# Patient Record
Sex: MALE | Race: WHITE | NOT HISPANIC OR LATINO | Employment: FULL TIME | URBAN - METROPOLITAN AREA
[De-identification: names, ages, dates, MRNs, and addresses within clinical notes are randomized per-mention and may not be internally consistent; named-entity substitution may affect disease eponyms.]

---

## 2023-10-22 PROBLEM — K42.9 UMBILICAL HERNIA WITHOUT OBSTRUCTION AND WITHOUT GANGRENE: Status: ACTIVE | Noted: 2023-10-22

## 2023-10-22 PROBLEM — R06.02 SOB (SHORTNESS OF BREATH) ON EXERTION: Status: ACTIVE | Noted: 2023-10-22

## 2023-10-22 PROBLEM — J45.990 ASTHMA, EXERCISE INDUCED (HHS-HCC): Status: ACTIVE | Noted: 2023-10-22

## 2023-10-22 PROBLEM — I10 BENIGN ESSENTIAL HYPERTENSION: Status: ACTIVE | Noted: 2023-10-22

## 2023-10-22 RX ORDER — LISINOPRIL 10 MG/1
1 TABLET ORAL DAILY
COMMUNITY
Start: 2022-06-20 | End: 2023-11-02 | Stop reason: ALTCHOICE

## 2023-10-24 ENCOUNTER — OFFICE VISIT (OUTPATIENT)
Dept: SURGERY | Facility: CLINIC | Age: 43
End: 2023-10-24
Payer: COMMERCIAL

## 2023-10-24 VITALS
WEIGHT: 204.4 LBS | DIASTOLIC BLOOD PRESSURE: 94 MMHG | HEART RATE: 88 BPM | RESPIRATION RATE: 16 BRPM | SYSTOLIC BLOOD PRESSURE: 148 MMHG | OXYGEN SATURATION: 98 % | BODY MASS INDEX: 30.27 KG/M2 | TEMPERATURE: 98.6 F | HEIGHT: 69 IN

## 2023-10-24 DIAGNOSIS — K42.9 UMBILICAL HERNIA WITHOUT OBSTRUCTION AND WITHOUT GANGRENE: Primary | ICD-10-CM

## 2023-10-24 DIAGNOSIS — I10 BENIGN ESSENTIAL HYPERTENSION: ICD-10-CM

## 2023-10-24 PROCEDURE — 3077F SYST BP >= 140 MM HG: CPT | Performed by: SURGERY

## 2023-10-24 PROCEDURE — 99213 OFFICE O/P EST LOW 20 MIN: CPT | Performed by: SURGERY

## 2023-10-24 PROCEDURE — 1036F TOBACCO NON-USER: CPT | Performed by: SURGERY

## 2023-10-24 PROCEDURE — 3080F DIAST BP >= 90 MM HG: CPT | Performed by: SURGERY

## 2023-10-24 RX ORDER — SODIUM CHLORIDE, SODIUM LACTATE, POTASSIUM CHLORIDE, CALCIUM CHLORIDE 600; 310; 30; 20 MG/100ML; MG/100ML; MG/100ML; MG/100ML
100 INJECTION, SOLUTION INTRAVENOUS CONTINUOUS
Status: CANCELLED | OUTPATIENT
Start: 2023-10-24

## 2023-10-24 NOTE — H&P (VIEW-ONLY)
History Of Present Illness  Uriel Julien is a 43 y.o. male presenting with follow-up symptomatic umbilical hernia present since mid August.  Previously evaluated.  No change in health since the previous visit.         Past Medical History  No past medical history on file.    Surgical History  Past Surgical History:   Procedure Laterality Date    OTHER SURGICAL HISTORY  01/03/2022    [place] tooth extraction        Social History  He reports that he has never smoked. He has never used smokeless tobacco. He reports current alcohol use. He reports that he does not use drugs.    Family History  Family History   Problem Relation Name Age of Onset    Hypertension Mother      Macular degeneration Mother      Breast cancer Maternal Grandmother      Hyperlipidemia Maternal Grandfather      Hypertension Maternal Grandfather          Allergies  Patient has no known allergies.    Review of Systems currently denies chest pain shortness of breath leg pain.  See HPI     Physical Exam GENERAL  MENTAL STATUS - Alert. GENERAL APPEARANCE - Cooperative and well groomed. ORIENTATION - Oriented X4. BUILD & NUTRITION - Well nourished and well developed. HYDRATION - Well hydrated.    INTEGUMENTARY  GENERAL CHARACTERISTICS - Overall examination of the patient´s skin reveals no rashes. COLOR - not icteric. SKIN MOISTURE - normal skin moisture. TEMPERATURE - normal worth is noted.    HEAD AND NECK  HEAD  HEAD SHAPE - Atraumatic and normocephalic.  TRACHEA - midline.  THYROID  GLAND CHARACTERISTICS - normal size and consistency and no palpable nodules.    EYE  SCLERA/CONJUNCTIVA - BILATERAL - Anicteric.    CHEST AND LUNG EXAM  AUSCULTATION -  BREATH SOUNDS - Clear.    BREAST - Did not examine.    CARDIOVASCULAR  AUSCULTATION: RHYTHM - Regular. HEART SOUNDS - Normal heart sounds.  MURMURS & OTHER HEART SOUNDS - Auscultation of the heart reveals regular rate and no murmurs.    ABDOMEN  PALPATION/PERCUSSION - Palpation and percussion of the  "abdomen reveal soft, non tender, no mass and no hepatosplenomegaly.  2 cm defect at umbilicus reducible.  No inguinal hernia no femoral hernia    LYMPHATIC  GENERAL LYMPHATICS  BREAST LYMPHATIC EXAM - No cervical adenopathy, supraclavicular adenopathy or axilliary adenopathy.        Last Recorded Vitals  Blood pressure (!) 148/94, pulse 88, temperature 37 °C (98.6 °F), temperature source Temporal, resp. rate 16, height 1.753 m (5' 9\"), weight 92.7 kg (204 lb 6.4 oz), SpO2 98 %.    Relevant Results             Assessment/Plan       Symptomatic umbilical hernia.  I have advocated immediate medical attention for painful and or nonreducible mass and recommended elective repair of the hernia. I have discussed the risks ,benefits and the alternatives to repair as well as the risks and benefits of the alternatives including but not limited to bleeding and infection. I have placed the risk of recurrence with mesh at 1% and up to 2-5% without mesh. I discussed the risk of infection and rejection of the mesh and need for repeat intervention as well as the risk of neuropathy, partial or permanent. I discussed potential for bowel resection in the face of strangulation and/or incarceration, limited use of mesh under these circumstances and increased risk of recurrence. Questions were answered and the patient agrees to proceed.       I spent 20 minutes in the professional and overall care of this patient.      Noel Ortiz MD    "

## 2023-10-24 NOTE — H&P
History Of Present Illness  Uriel Julien is a 43 y.o. male presenting with follow-up symptomatic umbilical hernia present since mid August.  Previously evaluated.  No change in health since the previous visit.         Past Medical History  No past medical history on file.    Surgical History  Past Surgical History:   Procedure Laterality Date    OTHER SURGICAL HISTORY  01/03/2022    [place] tooth extraction        Social History  He reports that he has never smoked. He has never used smokeless tobacco. He reports current alcohol use. He reports that he does not use drugs.    Family History  Family History   Problem Relation Name Age of Onset    Hypertension Mother      Macular degeneration Mother      Breast cancer Maternal Grandmother      Hyperlipidemia Maternal Grandfather      Hypertension Maternal Grandfather          Allergies  Patient has no known allergies.    Review of Systems currently denies chest pain shortness of breath leg pain.  See HPI     Physical Exam GENERAL  MENTAL STATUS - Alert. GENERAL APPEARANCE - Cooperative and well groomed. ORIENTATION - Oriented X4. BUILD & NUTRITION - Well nourished and well developed. HYDRATION - Well hydrated.    INTEGUMENTARY  GENERAL CHARACTERISTICS - Overall examination of the patient´s skin reveals no rashes. COLOR - not icteric. SKIN MOISTURE - normal skin moisture. TEMPERATURE - normal worth is noted.    HEAD AND NECK  HEAD  HEAD SHAPE - Atraumatic and normocephalic.  TRACHEA - midline.  THYROID  GLAND CHARACTERISTICS - normal size and consistency and no palpable nodules.    EYE  SCLERA/CONJUNCTIVA - BILATERAL - Anicteric.    CHEST AND LUNG EXAM  AUSCULTATION -  BREATH SOUNDS - Clear.    BREAST - Did not examine.    CARDIOVASCULAR  AUSCULTATION: RHYTHM - Regular. HEART SOUNDS - Normal heart sounds.  MURMURS & OTHER HEART SOUNDS - Auscultation of the heart reveals regular rate and no murmurs.    ABDOMEN  PALPATION/PERCUSSION - Palpation and percussion of the  "abdomen reveal soft, non tender, no mass and no hepatosplenomegaly.  2 cm defect at umbilicus reducible.  No inguinal hernia no femoral hernia    LYMPHATIC  GENERAL LYMPHATICS  BREAST LYMPHATIC EXAM - No cervical adenopathy, supraclavicular adenopathy or axilliary adenopathy.        Last Recorded Vitals  Blood pressure (!) 148/94, pulse 88, temperature 37 °C (98.6 °F), temperature source Temporal, resp. rate 16, height 1.753 m (5' 9\"), weight 92.7 kg (204 lb 6.4 oz), SpO2 98 %.    Relevant Results             Assessment/Plan       Symptomatic umbilical hernia.  I have advocated immediate medical attention for painful and or nonreducible mass and recommended elective repair of the hernia. I have discussed the risks ,benefits and the alternatives to repair as well as the risks and benefits of the alternatives including but not limited to bleeding and infection. I have placed the risk of recurrence with mesh at 1% and up to 2-5% without mesh. I discussed the risk of infection and rejection of the mesh and need for repeat intervention as well as the risk of neuropathy, partial or permanent. I discussed potential for bowel resection in the face of strangulation and/or incarceration, limited use of mesh under these circumstances and increased risk of recurrence. Questions were answered and the patient agrees to proceed.       I spent 20 minutes in the professional and overall care of this patient.      Noel Ortiz MD    "

## 2023-10-24 NOTE — H&P
History Of Present Illness  Uriel Julien is a 43 y.o. male presenting with painful mass around the umbilicus.  No past history of coronary artery disease hypertension diabetes.  Patient identified the area in mid August.  No history of weight loss change in bowel habits no history of bleeding infectious complications related to surgery.  Non-smoker denies drug and alcohol abuse     Past Medical History  No past medical history on file.    Surgical History  Past Surgical History:   Procedure Laterality Date    OTHER SURGICAL HISTORY  01/03/2022    [place] tooth extraction        Social History  He reports that he has never smoked. He has never used smokeless tobacco. He reports current alcohol use. He reports that he does not use drugs.    Family History  Family History   Problem Relation Name Age of Onset    Hypertension Mother      Macular degeneration Mother      Breast cancer Maternal Grandmother      Hyperlipidemia Maternal Grandfather      Hypertension Maternal Grandfather          Allergies  Patient has no known allergies.    Review of Systems 10 review of systems negative     Physical Exam GENERAL  MENTAL STATUS - Alert. GENERAL APPEARANCE - Cooperative and well groomed. ORIENTATION - Oriented X4. BUILD & NUTRITION - Well nourished and well developed. HYDRATION - Well hydrated.    INTEGUMENTARY  GENERAL CHARACTERISTICS - Overall examination of the patient´s skin reveals no rashes. COLOR - not icteric. SKIN MOISTURE - normal skin moisture. TEMPERATURE - normal worth is noted.    HEAD AND NECK  HEAD  HEAD SHAPE - Atraumatic and normocephalic.  TRACHEA - midline.  THYROID  GLAND CHARACTERISTICS - normal size and consistency and no palpable nodules.    EYE  SCLERA/CONJUNCTIVA - BILATERAL - Anicteric.    CHEST AND LUNG EXAM  AUSCULTATION -  BREATH SOUNDS - Clear.    BREAST - Did not examine.    CARDIOVASCULAR  AUSCULTATION: RHYTHM - Regular. HEART SOUNDS - Normal heart sounds.  MURMURS & OTHER HEART SOUNDS -  "Auscultation of the heart reveals regular rate and no murmurs.    ABDOMEN  PALPATION/PERCUSSION - Palpation and percussion of the abdomen reveal soft, non tender, no mass and no hepatosplenomegaly.  2 cm defect at umbilicus reducible.  No evidence of umbilical hernia    LYMPHATIC  GENERAL LYMPHATICS  BREAST LYMPHATIC EXAM - No cervical adenopathy, supraclavicular adenopathy or axilliary adenopathy.        Last Recorded Vitals  Blood pressure (!) 148/94, pulse 88, temperature 37 °C (98.6 °F), temperature source Temporal, resp. rate 16, height 1.753 m (5' 9\"), weight 92.7 kg (204 lb 6.4 oz), SpO2 98 %.    Relevant Results           Assessment/Plan       Symptomatic umbilical hernia.  I have advocated immediate medical attention for painful and or nonreducible mass and recommended elective repair of the hernia. I have discussed the risks ,benefits and the alternatives to repair as well as the risks and benefits of the alternatives including but not limited to bleeding and infection. I have placed the risk of recurrence with mesh at 1% and up to 2-5% without mesh. I discussed the risk of infection and rejection of the mesh and need for repeat intervention as well as the risk of neuropathy, partial or permanent. I discussed potential for bowel resection in the face of strangulation and/or incarceration, limited use of mesh under these circumstances and increased risk of recurrence. Questions were answered and the patient agrees to proceed.       I spent 20 minutes in the professional and overall care of this patient.      Noel Ortiz MD    "

## 2023-10-24 NOTE — H&P
History Of Present Illness  Uriel Julien is a 43 y.o. male presenting with painful mass around the umbilicus.  No past history of coronary artery disease hypertension diabetes.  Patient identified the area in mid August.  No history of weight loss change in bowel habits no history of bleeding infectious complications related to surgery.  Non-smoker denies drug and alcohol abuse     Past Medical History  No past medical history on file.    Surgical History  Past Surgical History:   Procedure Laterality Date    OTHER SURGICAL HISTORY  01/03/2022    [place] tooth extraction        Social History  He reports that he has never smoked. He has never used smokeless tobacco. He reports current alcohol use. He reports that he does not use drugs.    Family History  Family History   Problem Relation Name Age of Onset    Hypertension Mother      Macular degeneration Mother      Breast cancer Maternal Grandmother      Hyperlipidemia Maternal Grandfather      Hypertension Maternal Grandfather          Allergies  Patient has no known allergies.    Review of Systems 10 review of systems negative     Physical Exam GENERAL  MENTAL STATUS - Alert. GENERAL APPEARANCE - Cooperative and well groomed. ORIENTATION - Oriented X4. BUILD & NUTRITION - Well nourished and well developed. HYDRATION - Well hydrated.    INTEGUMENTARY  GENERAL CHARACTERISTICS - Overall examination of the patient´s skin reveals no rashes. COLOR - not icteric. SKIN MOISTURE - normal skin moisture. TEMPERATURE - normal worth is noted.    HEAD AND NECK  HEAD  HEAD SHAPE - Atraumatic and normocephalic.  TRACHEA - midline.  THYROID  GLAND CHARACTERISTICS - normal size and consistency and no palpable nodules.    EYE  SCLERA/CONJUNCTIVA - BILATERAL - Anicteric.    CHEST AND LUNG EXAM  AUSCULTATION -  BREATH SOUNDS - Clear.    BREAST - Did not examine.    CARDIOVASCULAR  AUSCULTATION: RHYTHM - Regular. HEART SOUNDS - Normal heart sounds.  MURMURS & OTHER HEART SOUNDS -  "Auscultation of the heart reveals regular rate and no murmurs.    ABDOMEN  PALPATION/PERCUSSION - Palpation and percussion of the abdomen reveal soft, non tender, no mass and no hepatosplenomegaly.  2 cm defect at umbilicus reducible.  No evidence of umbilical hernia    LYMPHATIC  GENERAL LYMPHATICS  BREAST LYMPHATIC EXAM - No cervical adenopathy, supraclavicular adenopathy or axilliary adenopathy.        Last Recorded Vitals  Blood pressure (!) 148/94, pulse 88, temperature 37 °C (98.6 °F), temperature source Temporal, resp. rate 16, height 1.753 m (5' 9\"), weight 92.7 kg (204 lb 6.4 oz), SpO2 98 %.    Relevant Results           Assessment/Plan       Symptomatic umbilical hernia.  I have advocated immediate medical attention for painful and or nonreducible mass and recommended elective repair of the hernia. I have discussed the risks ,benefits and the alternatives to repair as well as the risks and benefits of the alternatives including but not limited to bleeding and infection. I have placed the risk of recurrence with mesh at 1% and up to 2-5% without mesh. I discussed the risk of infection and rejection of the mesh and need for repeat intervention as well as the risk of neuropathy, partial or permanent. I discussed potential for bowel resection in the face of strangulation and/or incarceration, limited use of mesh under these circumstances and increased risk of recurrence. Questions were answered and the patient agrees to proceed.       I spent 20 minutes in the professional and overall care of this patient.      Noel Ortiz MD    "

## 2023-10-24 NOTE — H&P
History Of Present Illness  Uriel Julien is a 43 y.o. male presenting with painful mass around the umbilicus.  No past history of coronary artery disease hypertension diabetes.  Patient identified the area in mid August.  No history of weight loss change in bowel habits no history of bleeding infectious complications related to surgery.  Non-smoker denies drug and alcohol abuse     Past Medical History  No past medical history on file.    Surgical History  Past Surgical History:   Procedure Laterality Date    OTHER SURGICAL HISTORY  01/03/2022    [place] tooth extraction        Social History  He reports that he has never smoked. He has never used smokeless tobacco. He reports current alcohol use. He reports that he does not use drugs.    Family History  Family History   Problem Relation Name Age of Onset    Hypertension Mother      Macular degeneration Mother      Breast cancer Maternal Grandmother      Hyperlipidemia Maternal Grandfather      Hypertension Maternal Grandfather          Allergies  Patient has no known allergies.    Review of Systems 10 review of systems negative     Physical Exam GENERAL  MENTAL STATUS - Alert. GENERAL APPEARANCE - Cooperative and well groomed. ORIENTATION - Oriented X4. BUILD & NUTRITION - Well nourished and well developed. HYDRATION - Well hydrated.    INTEGUMENTARY  GENERAL CHARACTERISTICS - Overall examination of the patient´s skin reveals no rashes. COLOR - not icteric. SKIN MOISTURE - normal skin moisture. TEMPERATURE - normal worth is noted.    HEAD AND NECK  HEAD  HEAD SHAPE - Atraumatic and normocephalic.  TRACHEA - midline.  THYROID  GLAND CHARACTERISTICS - normal size and consistency and no palpable nodules.    EYE  SCLERA/CONJUNCTIVA - BILATERAL - Anicteric.    CHEST AND LUNG EXAM  AUSCULTATION -  BREATH SOUNDS - Clear.    BREAST - Did not examine.    CARDIOVASCULAR  AUSCULTATION: RHYTHM - Regular. HEART SOUNDS - Normal heart sounds.  MURMURS & OTHER HEART SOUNDS -  "Auscultation of the heart reveals regular rate and no murmurs.    ABDOMEN  PALPATION/PERCUSSION - Palpation and percussion of the abdomen reveal soft, non tender, no mass and no hepatosplenomegaly.  2 cm defect at umbilicus reducible.  No evidence of umbilical hernia    LYMPHATIC  GENERAL LYMPHATICS  BREAST LYMPHATIC EXAM - No cervical adenopathy, supraclavicular adenopathy or axilliary adenopathy.        Last Recorded Vitals  Blood pressure (!) 148/94, pulse 88, temperature 37 °C (98.6 °F), temperature source Temporal, resp. rate 16, height 1.753 m (5' 9\"), weight 92.7 kg (204 lb 6.4 oz), SpO2 98 %.    Relevant Results           Assessment/Plan       Symptomatic umbilical hernia.  I have advocated immediate medical attention for painful and or nonreducible mass and recommended elective repair of the hernia. I have discussed the risks ,benefits and the alternatives to repair as well as the risks and benefits of the alternatives including but not limited to bleeding and infection. I have placed the risk of recurrence with mesh at 1% and up to 2-5% without mesh. I discussed the risk of infection and rejection of the mesh and need for repeat intervention as well as the risk of neuropathy, partial or permanent. I discussed potential for bowel resection in the face of strangulation and/or incarceration, limited use of mesh under these circumstances and increased risk of recurrence. Questions were answered and the patient agrees to proceed.       I spent 20 minutes in the professional and overall care of this patient.      Noel Ortiz MD    "

## 2023-10-27 NOTE — PATIENT INSTRUCTIONS
Repair of your umbilical hernia will be performed as discussed.  Please seek immediate medical attention for any painful persistent mass over the area prior to your proposed procedure.  Contact the office for any questions regarding today's exam or your proposed procedure.  COVID-19 is currently not required for testing for outpatient surgery.  However should you experience any symptoms suggestive of COVID-19 including but not limited to fever chills sore throat runny nose cough persistent abdominal pains loss of the sense that smell or taste please obtain testing and notify your primary care physician.

## 2023-10-27 NOTE — PROGRESS NOTES
History Of Present Illness  HPI   Follow-up umbilical hernia.  Still occasional pulling pain over the area.  Denies groin pain.  Past Medical History  He has no past medical history on file.    Surgical History  He has a past surgical history that includes Other surgical history (01/03/2022).     Social History  He reports that he has never smoked. He has never used smokeless tobacco. He reports current alcohol use. He reports that he does not use drugs.    Family History  Family History   Problem Relation Name Age of Onset    Hypertension Mother      Macular degeneration Mother      Breast cancer Maternal Grandmother      Hyperlipidemia Maternal Grandfather      Hypertension Maternal Grandfather          Allergies  Patient has no known allergies.    Review of Systems     Visit Vitals  BP (!) 148/94 (BP Location: Left arm, Patient Position: Sitting, BP Cuff Size: Large adult)   Pulse 88   Temp 37 °C (98.6 °F) (Temporal)   Resp 16        Physical Exam GENERAL  MENTAL STATUS - Alert. GENERAL APPEARANCE - Cooperative and well groomed. ORIENTATION - Oriented X4. BUILD & NUTRITION - Well nourished and well developed. HYDRATION - Well hydrated.    INTEGUMENTARY  GENERAL CHARACTERISTICS - Overall examination of the patient´s skin reveals no rashes. COLOR - not icteric. SKIN MOISTURE - normal skin moisture. TEMPERATURE - normal worth is noted.    HEAD AND NECK  HEAD  HEAD SHAPE - Atraumatic and normocephalic.  TRACHEA - midline.  THYROID  GLAND CHARACTERISTICS - normal size and consistency and no palpable nodules.    EYE  SCLERA/CONJUNCTIVA - BILATERAL - Anicteric.    CHEST AND LUNG EXAM  AUSCULTATION -  BREATH SOUNDS - Clear.    BREAST - Did not examine.    CARDIOVASCULAR  AUSCULTATION: RHYTHM - Regular. HEART SOUNDS - Normal heart sounds.  MURMURS & OTHER HEART SOUNDS - Auscultation of the heart reveals regular rate and no murmurs.    ABDOMEN  PALPATION/PERCUSSION - Palpation and percussion of the abdomen reveal soft, non  "tender, no mass and no hepatosplenomegaly.  Less than 3 cm reducible umbilical hernia.  No evidence of inguinal or femoral hernia    LYMPHATIC  GENERAL LYMPHATICS   LYMPHATIC EXAM - No cervical adenopathy, supraclavicular adenopathy or axilliary adenopathy.     Extremities without edema    Last Recorded Vitals  Blood pressure (!) 148/94, pulse 88, temperature 37 °C (98.6 °F), temperature source Temporal, resp. rate 16, height 1.753 m (5' 9\"), weight 92.7 kg (204 lb 6.4 oz), SpO2 98 %.    Relevant Results      No results found.      @[email or web address]@    Assessment/Plan       Symptomatic umbilical hernia.  I have advocated immediate medical attention for painful and or nonreducible mass and recommended elective repair of the hernia. I have discussed the risks ,benefits and the alternatives to repair as well as the risks and benefits of the alternatives including but not limited to bleeding and infection. I have placed the risk of recurrence with mesh at 1% and up to 2-5% without mesh. I discussed the risk of infection and rejection of the mesh and need for repeat intervention as well as the risk of neuropathy, partial or permanent. I discussed potential for bowel resection in the face of strangulation and/or incarceration, limited use of mesh under these circumstances and increased risk of recurrence. Questions were answered and the patient agrees to proceed.       I spent 20 minutes in the professional and overall care of this patient.      Noel Ortiz MD  "

## 2023-11-01 ENCOUNTER — LAB (OUTPATIENT)
Dept: LAB | Facility: LAB | Age: 43
End: 2023-11-01
Payer: COMMERCIAL

## 2023-11-01 DIAGNOSIS — K42.9 UMBILICAL HERNIA WITHOUT OBSTRUCTION AND WITHOUT GANGRENE: ICD-10-CM

## 2023-11-01 LAB
ANION GAP SERPL CALC-SCNC: 14 MMOL/L (ref 10–20)
BUN SERPL-MCNC: 16 MG/DL (ref 6–23)
CALCIUM SERPL-MCNC: 9.4 MG/DL (ref 8.6–10.3)
CHLORIDE SERPL-SCNC: 102 MMOL/L (ref 98–107)
CO2 SERPL-SCNC: 25 MMOL/L (ref 21–32)
CREAT SERPL-MCNC: 1.08 MG/DL (ref 0.5–1.3)
ERYTHROCYTE [DISTWIDTH] IN BLOOD BY AUTOMATED COUNT: 12.9 % (ref 11.5–14.5)
GFR SERPL CREATININE-BSD FRML MDRD: 88 ML/MIN/1.73M*2
GLUCOSE SERPL-MCNC: 152 MG/DL (ref 74–99)
HCT VFR BLD AUTO: 44.4 % (ref 41–52)
HGB BLD-MCNC: 14.9 G/DL (ref 13.5–17.5)
MCH RBC QN AUTO: 30.6 PG (ref 26–34)
MCHC RBC AUTO-ENTMCNC: 33.6 G/DL (ref 32–36)
MCV RBC AUTO: 91 FL (ref 80–100)
NRBC BLD-RTO: 0 /100 WBCS (ref 0–0)
PLATELET # BLD AUTO: 238 X10*3/UL (ref 150–450)
POTASSIUM SERPL-SCNC: 4.2 MMOL/L (ref 3.5–5.3)
RBC # BLD AUTO: 4.87 X10*6/UL (ref 4.5–5.9)
SODIUM SERPL-SCNC: 137 MMOL/L (ref 136–145)
WBC # BLD AUTO: 6 X10*3/UL (ref 4.4–11.3)

## 2023-11-01 PROCEDURE — 80048 BASIC METABOLIC PNL TOTAL CA: CPT

## 2023-11-01 PROCEDURE — 36415 COLL VENOUS BLD VENIPUNCTURE: CPT

## 2023-11-01 PROCEDURE — 85027 COMPLETE CBC AUTOMATED: CPT

## 2023-11-03 ENCOUNTER — ANESTHESIA EVENT (OUTPATIENT)
Dept: OPERATING ROOM | Facility: HOSPITAL | Age: 43
End: 2023-11-03
Payer: COMMERCIAL

## 2023-11-03 ENCOUNTER — ANESTHESIA (OUTPATIENT)
Dept: OPERATING ROOM | Facility: HOSPITAL | Age: 43
End: 2023-11-03
Payer: COMMERCIAL

## 2023-11-03 ENCOUNTER — HOSPITAL ENCOUNTER (OUTPATIENT)
Facility: HOSPITAL | Age: 43
Setting detail: OUTPATIENT SURGERY
Discharge: HOME | End: 2023-11-03
Attending: SURGERY | Admitting: SURGERY
Payer: COMMERCIAL

## 2023-11-03 VITALS
HEIGHT: 69 IN | OXYGEN SATURATION: 100 % | DIASTOLIC BLOOD PRESSURE: 85 MMHG | WEIGHT: 204.37 LBS | BODY MASS INDEX: 30.27 KG/M2 | TEMPERATURE: 98.1 F | HEART RATE: 76 BPM | SYSTOLIC BLOOD PRESSURE: 149 MMHG | RESPIRATION RATE: 18 BRPM

## 2023-11-03 DIAGNOSIS — K42.9 UMBILICAL HERNIA WITHOUT OBSTRUCTION AND WITHOUT GANGRENE: Primary | ICD-10-CM

## 2023-11-03 PROCEDURE — 3600000008 HC OR TIME - EACH INCREMENTAL 1 MINUTE - PROCEDURE LEVEL THREE: Performed by: SURGERY

## 2023-11-03 PROCEDURE — 2500000004 HC RX 250 GENERAL PHARMACY W/ HCPCS (ALT 636 FOR OP/ED): Performed by: ANESTHESIOLOGIST ASSISTANT

## 2023-11-03 PROCEDURE — 2500000001 HC RX 250 WO HCPCS SELF ADMINISTERED DRUGS (ALT 637 FOR MEDICARE OP): Performed by: ANESTHESIOLOGIST ASSISTANT

## 2023-11-03 PROCEDURE — 3700000002 HC GENERAL ANESTHESIA TIME - EACH INCREMENTAL 1 MINUTE: Performed by: SURGERY

## 2023-11-03 PROCEDURE — 3700000001 HC GENERAL ANESTHESIA TIME - INITIAL BASE CHARGE: Performed by: SURGERY

## 2023-11-03 PROCEDURE — C1781 MESH (IMPLANTABLE): HCPCS | Performed by: SURGERY

## 2023-11-03 PROCEDURE — 49591 RPR AA HRN 1ST < 3 CM RDC: CPT | Performed by: SURGERY

## 2023-11-03 PROCEDURE — 7100000009 HC PHASE TWO TIME - INITIAL BASE CHARGE: Performed by: SURGERY

## 2023-11-03 PROCEDURE — 2780000003 HC OR 278 NO HCPCS: Performed by: SURGERY

## 2023-11-03 PROCEDURE — 2500000004 HC RX 250 GENERAL PHARMACY W/ HCPCS (ALT 636 FOR OP/ED): Performed by: SURGERY

## 2023-11-03 PROCEDURE — 7100000010 HC PHASE TWO TIME - EACH INCREMENTAL 1 MINUTE: Performed by: SURGERY

## 2023-11-03 PROCEDURE — 3600000003 HC OR TIME - INITIAL BASE CHARGE - PROCEDURE LEVEL THREE: Performed by: SURGERY

## 2023-11-03 PROCEDURE — A49591 PR RPR AA HERNIA 1ST < 3 CM REDUCIBLE: Performed by: ANESTHESIOLOGY

## 2023-11-03 PROCEDURE — 2500000005 HC RX 250 GENERAL PHARMACY W/O HCPCS: Performed by: ANESTHESIOLOGIST ASSISTANT

## 2023-11-03 PROCEDURE — A49591 PR RPR AA HERNIA 1ST < 3 CM REDUCIBLE: Performed by: ANESTHESIOLOGIST ASSISTANT

## 2023-11-03 DEVICE — BARD SOFT MESH
Type: IMPLANTABLE DEVICE | Site: ABDOMEN | Status: FUNCTIONAL
Brand: BARD SOFT MESH

## 2023-11-03 RX ORDER — ONDANSETRON HYDROCHLORIDE 2 MG/ML
INJECTION, SOLUTION INTRAVENOUS AS NEEDED
Status: DISCONTINUED | OUTPATIENT
Start: 2023-11-03 | End: 2023-11-03

## 2023-11-03 RX ORDER — SODIUM CHLORIDE, SODIUM LACTATE, POTASSIUM CHLORIDE, CALCIUM CHLORIDE 600; 310; 30; 20 MG/100ML; MG/100ML; MG/100ML; MG/100ML
100 INJECTION, SOLUTION INTRAVENOUS CONTINUOUS
Status: DISCONTINUED | OUTPATIENT
Start: 2023-11-03 | End: 2023-11-03 | Stop reason: HOSPADM

## 2023-11-03 RX ORDER — ACETAMINOPHEN 325 MG/1
650 TABLET ORAL EVERY 6 HOURS PRN
Qty: 8 TABLET | Refills: 0 | Status: SHIPPED | OUTPATIENT
Start: 2023-11-03 | End: 2023-11-05

## 2023-11-03 RX ORDER — HYDRALAZINE HYDROCHLORIDE 20 MG/ML
5 INJECTION INTRAMUSCULAR; INTRAVENOUS EVERY 30 MIN PRN
Status: DISCONTINUED | OUTPATIENT
Start: 2023-11-03 | End: 2023-11-03 | Stop reason: HOSPADM

## 2023-11-03 RX ORDER — FENTANYL CITRATE 50 UG/ML
INJECTION, SOLUTION INTRAMUSCULAR; INTRAVENOUS AS NEEDED
Status: DISCONTINUED | OUTPATIENT
Start: 2023-11-03 | End: 2023-11-03

## 2023-11-03 RX ORDER — MIDAZOLAM HYDROCHLORIDE 1 MG/ML
1 INJECTION, SOLUTION INTRAMUSCULAR; INTRAVENOUS ONCE AS NEEDED
Status: DISCONTINUED | OUTPATIENT
Start: 2023-11-03 | End: 2023-11-03 | Stop reason: HOSPADM

## 2023-11-03 RX ORDER — ACETAMINOPHEN 325 MG/1
650 TABLET ORAL EVERY 4 HOURS PRN
Status: DISCONTINUED | OUTPATIENT
Start: 2023-11-03 | End: 2023-11-03 | Stop reason: HOSPADM

## 2023-11-03 RX ORDER — ONDANSETRON HYDROCHLORIDE 2 MG/ML
4 INJECTION, SOLUTION INTRAVENOUS ONCE AS NEEDED
Status: DISCONTINUED | OUTPATIENT
Start: 2023-11-03 | End: 2023-11-03 | Stop reason: HOSPADM

## 2023-11-03 RX ORDER — DIPHENHYDRAMINE HYDROCHLORIDE 50 MG/ML
12.5 INJECTION INTRAMUSCULAR; INTRAVENOUS ONCE AS NEEDED
Status: DISCONTINUED | OUTPATIENT
Start: 2023-11-03 | End: 2023-11-03 | Stop reason: HOSPADM

## 2023-11-03 RX ORDER — CEFAZOLIN SODIUM 2 G/100ML
2 INJECTION, SOLUTION INTRAVENOUS ONCE
Status: COMPLETED | OUTPATIENT
Start: 2023-11-03 | End: 2023-11-03

## 2023-11-03 RX ORDER — LABETALOL HYDROCHLORIDE 5 MG/ML
5 INJECTION, SOLUTION INTRAVENOUS ONCE AS NEEDED
Status: DISCONTINUED | OUTPATIENT
Start: 2023-11-03 | End: 2023-11-03 | Stop reason: HOSPADM

## 2023-11-03 RX ORDER — HYDROMORPHONE HYDROCHLORIDE 1 MG/ML
1 INJECTION, SOLUTION INTRAMUSCULAR; INTRAVENOUS; SUBCUTANEOUS EVERY 5 MIN PRN
Status: DISCONTINUED | OUTPATIENT
Start: 2023-11-03 | End: 2023-11-03 | Stop reason: HOSPADM

## 2023-11-03 RX ORDER — LIDOCAINE HYDROCHLORIDE 20 MG/ML
SOLUTION OROPHARYNGEAL AS NEEDED
Status: DISCONTINUED | OUTPATIENT
Start: 2023-11-03 | End: 2023-11-03

## 2023-11-03 RX ORDER — PROPOFOL 10 MG/ML
INJECTION, EMULSION INTRAVENOUS AS NEEDED
Status: DISCONTINUED | OUTPATIENT
Start: 2023-11-03 | End: 2023-11-03

## 2023-11-03 RX ORDER — CEFAZOLIN SODIUM 2 G/100ML
INJECTION, SOLUTION INTRAVENOUS
Status: COMPLETED
Start: 2023-11-03 | End: 2023-11-03

## 2023-11-03 RX ORDER — PROPOFOL 10 MG/ML
INJECTION, EMULSION INTRAVENOUS CONTINUOUS PRN
Status: DISCONTINUED | OUTPATIENT
Start: 2023-11-03 | End: 2023-11-03

## 2023-11-03 RX ORDER — BUPIVACAINE HYDROCHLORIDE 5 MG/ML
INJECTION, SOLUTION EPIDURAL; INTRACAUDAL AS NEEDED
Status: DISCONTINUED | OUTPATIENT
Start: 2023-11-03 | End: 2023-11-03 | Stop reason: HOSPADM

## 2023-11-03 RX ORDER — LIDOCAINE HCL/PF 100 MG/5ML
SYRINGE (ML) INTRAVENOUS AS NEEDED
Status: DISCONTINUED | OUTPATIENT
Start: 2023-11-03 | End: 2023-11-03

## 2023-11-03 RX ORDER — MIDAZOLAM HYDROCHLORIDE 1 MG/ML
INJECTION, SOLUTION INTRAMUSCULAR; INTRAVENOUS AS NEEDED
Status: DISCONTINUED | OUTPATIENT
Start: 2023-11-03 | End: 2023-11-03

## 2023-11-03 RX ORDER — KETOROLAC TROMETHAMINE 30 MG/ML
INJECTION, SOLUTION INTRAMUSCULAR; INTRAVENOUS AS NEEDED
Status: DISCONTINUED | OUTPATIENT
Start: 2023-11-03 | End: 2023-11-03

## 2023-11-03 RX ORDER — OXYCODONE HYDROCHLORIDE 5 MG/1
5 TABLET ORAL EVERY 6 HOURS PRN
Qty: 12 TABLET | Refills: 0 | Status: SHIPPED | OUTPATIENT
Start: 2023-11-03 | End: 2023-11-10

## 2023-11-03 RX ORDER — MEPERIDINE HYDROCHLORIDE 25 MG/ML
12.5 INJECTION INTRAMUSCULAR; INTRAVENOUS; SUBCUTANEOUS EVERY 10 MIN PRN
Status: DISCONTINUED | OUTPATIENT
Start: 2023-11-03 | End: 2023-11-03 | Stop reason: HOSPADM

## 2023-11-03 RX ORDER — PROMETHAZINE HYDROCHLORIDE 50 MG/ML
12.5 INJECTION, SOLUTION INTRAMUSCULAR; INTRAVENOUS ONCE AS NEEDED
Status: DISCONTINUED | OUTPATIENT
Start: 2023-11-03 | End: 2023-11-03 | Stop reason: HOSPADM

## 2023-11-03 RX ADMIN — LIDOCAINE HYDROCHLORIDE 100 MG: 20 INJECTION INTRAVENOUS at 08:57

## 2023-11-03 RX ADMIN — CEFAZOLIN SODIUM 2 G: 2 INJECTION, SOLUTION INTRAVENOUS at 08:58

## 2023-11-03 RX ADMIN — PROPOFOL 120 MCG/KG/MIN: 10 INJECTION, EMULSION INTRAVENOUS at 08:57

## 2023-11-03 RX ADMIN — LIDOCAINE HYDROCHLORIDE 5 ML: 20 SOLUTION ORAL; TOPICAL at 09:07

## 2023-11-03 RX ADMIN — KETOROLAC TROMETHAMINE 30 MG: 30 INJECTION INTRAMUSCULAR; INTRAVENOUS at 09:27

## 2023-11-03 RX ADMIN — FENTANYL CITRATE 100 MCG: 50 INJECTION, SOLUTION INTRAMUSCULAR; INTRAVENOUS at 08:53

## 2023-11-03 RX ADMIN — PROPOFOL 30 MG: 10 INJECTION, EMULSION INTRAVENOUS at 09:06

## 2023-11-03 RX ADMIN — SODIUM CHLORIDE, POTASSIUM CHLORIDE, SODIUM LACTATE AND CALCIUM CHLORIDE 100 ML/HR: 600; 310; 30; 20 INJECTION, SOLUTION INTRAVENOUS at 07:26

## 2023-11-03 RX ADMIN — SODIUM CHLORIDE, SODIUM LACTATE, POTASSIUM CHLORIDE, AND CALCIUM CHLORIDE: .6; .31; .03; .02 INJECTION, SOLUTION INTRAVENOUS at 08:49

## 2023-11-03 RX ADMIN — PROPOFOL 100 MG: 10 INJECTION, EMULSION INTRAVENOUS at 08:57

## 2023-11-03 RX ADMIN — ONDANSETRON 4 MG: 2 INJECTION INTRAMUSCULAR; INTRAVENOUS at 08:53

## 2023-11-03 RX ADMIN — MIDAZOLAM 2 MG: 1 INJECTION INTRAMUSCULAR; INTRAVENOUS at 08:52

## 2023-11-03 RX ADMIN — PROPOFOL 30 MG: 10 INJECTION, EMULSION INTRAVENOUS at 09:03

## 2023-11-03 SDOH — HEALTH STABILITY: MENTAL HEALTH: CURRENT SMOKER: 0

## 2023-11-03 ASSESSMENT — PAIN SCALES - GENERAL
PAINLEVEL_OUTOF10: 0 - NO PAIN
PAINLEVEL_OUTOF10: 1
PAINLEVEL_OUTOF10: 0 - NO PAIN
PAINLEVEL_OUTOF10: 0 - NO PAIN

## 2023-11-03 ASSESSMENT — PAIN - FUNCTIONAL ASSESSMENT
PAIN_FUNCTIONAL_ASSESSMENT: 0-10

## 2023-11-03 NOTE — OP NOTE
UMBILICAL HERNIA REPAIR Operative Note     Date: 11/3/2023  OR Location: [facility]    Name: Uriel Julien, : 1980, Age: 43 y.o., MRN: [id number], Sex: male    Diagnosis  Pre-op Diagnosis     * Umbilical hernia without obstruction and without gangrene [K42.9] Post-op Diagnosis     * Umbilical hernia without obstruction and without gangrene [K42.9]     Procedures  UMBILICAL HERNIA REPAIR  11544 - NM RPR AA HERNIA 1ST < 3 CM REDUCIBLE      Surgeons      * Noel Ortiz - Primary    Resident/Fellow/Other Assistant:  Surgeon(s) and Role:    Procedure Summary  Anesthesia: Monitor Anesthesia Care  ASA: II  Anesthesia Staff: Anesthesiologist: Henok Erazo MD  C-AA: ALBA Chu  Estimated Blood Loss: 0mL  Intra-op Medications: * Intraprocedure medication information is unavailable because the case start and end events have not been set *           Anesthesia Record               Intraprocedure I/O Totals          Intake    .00 mL    Propofol Drip 0.00 mL    The total shown is the total volume documented since Anesthesia Start was filed.    Total Intake 600 mL       Output    Est. Blood Loss 5 mL    Total Output 5 mL       Net    Net Volume 595 mL          Specimen: No specimens collected     Staff:   Circulator: Hermelinda Bowling RN  Scrub Person: Carmel Acevedo         Drains and/or Catheters: * None in log *    Tourniquet Times:         Implants:  Implants       Type Name Action Serial No.      Surgical Mesh Sling Implant MESH, SURGICAL, SOFT, POLYPROPYLENE 4 X 6 - [id number] Implanted               Findings: Same    Indications: Uriel Julien is an 43 y.o. male who is having surgery for Umbilical hernia without obstruction and without gangrene [K42.9].     The patient was seen in the preoperative area. The risks, benefits, complications, treatment options, non-operative alternatives, expected recovery and outcomes were discussed with the patient. The possibilities of reaction to medication, pulmonary  aspiration, injury to surrounding structures, bleeding, recurrent infection, the need for additional procedures, failure to diagnose a condition, and creating a complication requiring transfusion or operation were discussed with the patient. The patient concurred with the proposed plan, giving informed consent.  The site of surgery was properly noted/marked if necessary per policy. The patient has been actively warmed in preoperative area. Preoperative antibiotics have been ordered and given within 1 hours of incision. Venous thrombosis prophylaxis have been ordered including bilateral sequential compression devices    Procedure Details: Patient was placed on the operating table.  Under monitored anesthesia 2 phase audible timeout was was performed in standard fashion including participation by the patient in the preinduction phase.  All questions were answered all agreed to proceed.  The abdomen was subsequently prepped and draped in a sterile fashion.  Transverse incision below the umbilicus was outlined using a marking pen.  The area was generously anesthetized 0.5% Marcaine plain throughout the skin and subcutaneous tissue.  Skin incision was cannel from the dermis to the subcutaneous tissue using the knife.  Behind cautery dissection the plane of the rectus was identified.  In a circumferential fashion I dissected off the area around the defect at the umbilicus.  The sac was entered.  It was taken off the umbilicus behind cautery dissection.  The preperitoneal contents were returned to the abdominal cavity.  The defect measuring less than 3 cm's was closed transversely with simple sutures of 0 Vicryl.  A small piece of soft mesh was then fixed to the area with a 4 cm overlap behind intermittent running sutures of 0 Vicryl.  Umbilicus was returned to the fascial plane with a suture of 3-0 Vicryl.  Subcutaneous tissue was closed running closure of 3-0 Vicryl.  Skin was closed with particular closure of 4-0  Vicryl.  30 cc of 0.5% Marcaine was instilled in a circumferential fashion during the course of the operation.  Steri-Strips dry sterile occlusive dressing was applied abdominal binder was applied.  He tolerated the procedure without complication transferred to the recovery room following termination of the procedure in stable condition.  Needle and sponge count reported to be correct  Complications:  None; patient tolerated the procedure well.    Disposition: PACU - hemodynamically stable.  Condition: stable         Additional Details:     Attending Attestation:     Noel Otriz  Phone Number: [phone]

## 2023-11-03 NOTE — ANESTHESIA PREPROCEDURE EVALUATION
Patient: Uriel Julien    Procedure Information       Date/Time: 11/03/23 0830    Procedure: UMBILICAL HERNIA REPAIR    Location: [facility] / [facility]    Surgeons: Noel Ortiz MD            Relevant Problems   Cardiovascular   (+) Benign essential hypertension      Pulmonary   (+) Asthma, exercise induced   (+) SOB (shortness of breath) on exertion       Clinical information reviewed:   Tobacco  Allergies  Meds  Problems  Med Hx  Surg Hx   Fam Hx  Soc   Hx        NPO Detail:  NPO/Void Status  Date of Last Liquid: 11/02/23  Time of Last Liquid: 2300  Date of Last Solid: 11/02/23  Time of Last Solid: 2000         Physical Exam    Airway  Mallampati: II  TM distance: >3 FB  Neck ROM: full     Cardiovascular - normal exam  Rhythm: regular  Rate: normal     Dental - normal exam     Pulmonary - normal exam     Abdominal            Anesthesia Plan    ASA 2     MAC     The patient is not a current smoker.    intravenous induction   Anesthetic plan and risks discussed with patient.    Plan discussed with CAA.

## 2023-11-03 NOTE — ANESTHESIA POSTPROCEDURE EVALUATION
Patient: Uriel Julien    Procedure Summary       Date: 11/03/23 Room / Location: [facility] / [facility]    Anesthesia Start: 0850 Anesthesia Stop: 0940    Procedure: UMBILICAL HERNIA REPAIR Diagnosis:       Umbilical hernia without obstruction and without gangrene      (Umbilical hernia without obstruction and without gangrene [K42.9])    Surgeons: Noel Ortiz MD Responsible Provider: Henok Erazo MD    Anesthesia Type: MAC ASA Status: 2            Anesthesia Type: MAC    Vitals Value Taken Time   /84 11/03/23 0938   Temp 36.2 11/03/23 0940   Pulse 92 11/03/23 0939   Resp 16 11/03/23 0940   SpO2 97 % 11/03/23 0939   Vitals shown include unvalidated device data.    Anesthesia Post Evaluation    Patient location during evaluation: bedside  Patient participation: complete - patient participated  Level of consciousness: awake and alert  Pain management: adequate  Airway patency: patent  Cardiovascular status: acceptable  Respiratory status: acceptable  Hydration status: acceptable        No notable events documented.

## 2023-11-03 NOTE — DISCHARGE INSTRUCTIONS
Ice pack as tolerated for the first 3-5 days at least. Where binder as often as possible including sleep until seen in office. Over-the-counter stool softener twice a day until regular bowel movements. If no bowel movement after 48 hours, use a laxative like milk of magnesia or MiraLAX for relief. Please call [phone] for appointment 10-14 days. Ibuprofen or Tylenol as needed for pain. Oxycodone as directed for pain not relieved by Tylenol or ibuprofen.  Please take 2 extra strength Tylenol or 3 ibuprofen when you arrive home today.  Take the medication again later this afternoon and again before bedtime.  Begin 4 times a day at least for the first 2 to 3 days and use the Percocet for breakthrough pain

## 2023-11-14 ENCOUNTER — OFFICE VISIT (OUTPATIENT)
Dept: SURGERY | Facility: CLINIC | Age: 43
End: 2023-11-14
Payer: COMMERCIAL

## 2023-11-14 VITALS
SYSTOLIC BLOOD PRESSURE: 156 MMHG | TEMPERATURE: 97.5 F | WEIGHT: 205 LBS | RESPIRATION RATE: 16 BRPM | OXYGEN SATURATION: 95 % | HEIGHT: 69 IN | DIASTOLIC BLOOD PRESSURE: 114 MMHG | HEART RATE: 87 BPM | BODY MASS INDEX: 30.36 KG/M2

## 2023-11-14 DIAGNOSIS — K42.9 UMBILICAL HERNIA WITHOUT OBSTRUCTION AND WITHOUT GANGRENE: Primary | ICD-10-CM

## 2023-11-14 PROCEDURE — 1036F TOBACCO NON-USER: CPT | Performed by: SURGERY

## 2023-11-14 PROCEDURE — 3080F DIAST BP >= 90 MM HG: CPT | Performed by: SURGERY

## 2023-11-14 PROCEDURE — 3077F SYST BP >= 140 MM HG: CPT | Performed by: SURGERY

## 2023-11-14 PROCEDURE — 99024 POSTOP FOLLOW-UP VISIT: CPT | Performed by: SURGERY

## 2023-11-14 NOTE — PATIENT INSTRUCTIONS
Thank you for choosing [facility].  At the present time her surgical incision is without signs of infection.  Please continue to increase her level of activity.  Please avoid lifting greater than 20 pounds for the next 10 days.  Then gradually resume activity as tolerated.  Follow-up in the office for any redness swelling drainage or any concern.

## 2023-11-14 NOTE — PROGRESS NOTES
Uriel Julien underwent open repair of umbilical hernia less than 3 cm's November 3.  Complains only of occasional pulling pain over the site.  Did have ecchymosis which is resolving.  Denies redness swelling or drainage from the area.  Denies chest pain shortness of breath leg pain.  General  Mental status: Alert. General Appearance: Not in acute distress. Orientation: Oriented X4.  Respiratory effort symmetric without accessory muscle use  Abdomen  Inspection: Inspection of the abdomen reveals - non-distended. Surgical Incision Details: [ Clean, dry and intact ] Palpation/Percussion: Palpation and Percussion of the abdomen reveal - Soft and non tender.    Peripheral Vasclar  Lower Extremity:  Palpation: Edema - Left: no edema. Right: no edema.  Satisfactory postop course.  Resume diet activity as tolerated follow-up as needed

## 2023-11-21 ENCOUNTER — APPOINTMENT (OUTPATIENT)
Dept: SURGERY | Facility: CLINIC | Age: 43
End: 2023-11-21
Payer: COMMERCIAL

## 2024-04-11 ENCOUNTER — OFFICE VISIT (OUTPATIENT)
Dept: URGENT CARE | Facility: CLINIC | Age: 44
End: 2024-04-11
Payer: COMMERCIAL

## 2024-04-11 VITALS
BODY MASS INDEX: 28.35 KG/M2 | SYSTOLIC BLOOD PRESSURE: 144 MMHG | TEMPERATURE: 97.9 F | HEART RATE: 61 BPM | OXYGEN SATURATION: 96 % | RESPIRATION RATE: 18 BRPM | WEIGHT: 192 LBS | DIASTOLIC BLOOD PRESSURE: 103 MMHG

## 2024-04-11 DIAGNOSIS — J45.990 ASTHMA, EXERCISE INDUCED (HHS-HCC): ICD-10-CM

## 2024-04-11 DIAGNOSIS — M54.50 ACUTE BILATERAL LOW BACK PAIN WITHOUT SCIATICA: ICD-10-CM

## 2024-04-11 DIAGNOSIS — M54.50 ACUTE BILATERAL LOW BACK PAIN WITHOUT SCIATICA: Primary | ICD-10-CM

## 2024-04-11 PROCEDURE — 99213 OFFICE O/P EST LOW 20 MIN: CPT | Performed by: FAMILY MEDICINE

## 2024-04-11 PROCEDURE — 1036F TOBACCO NON-USER: CPT | Performed by: FAMILY MEDICINE

## 2024-04-11 PROCEDURE — 3077F SYST BP >= 140 MM HG: CPT | Performed by: FAMILY MEDICINE

## 2024-04-11 PROCEDURE — 3080F DIAST BP >= 90 MM HG: CPT | Performed by: FAMILY MEDICINE

## 2024-04-11 RX ORDER — CYCLOBENZAPRINE HCL 10 MG
10 TABLET ORAL EVERY 8 HOURS PRN
Qty: 30 TABLET | Refills: 0 | Status: SHIPPED | OUTPATIENT
Start: 2024-04-11 | End: 2024-04-11 | Stop reason: SDUPTHER

## 2024-04-11 RX ORDER — METHYLPREDNISOLONE 4 MG/1
4 TABLET ORAL ONCE
Qty: 21 TABLET | Refills: 0 | Status: SHIPPED | OUTPATIENT
Start: 2024-04-11 | End: 2024-04-11 | Stop reason: SDUPTHER

## 2024-04-11 RX ORDER — CYCLOBENZAPRINE HCL 10 MG
10 TABLET ORAL EVERY 8 HOURS PRN
Qty: 30 TABLET | Refills: 0 | Status: SHIPPED | OUTPATIENT
Start: 2024-04-11 | End: 2024-04-21 | Stop reason: ALTCHOICE

## 2024-04-11 RX ORDER — METHYLPREDNISOLONE 4 MG/1
4 TABLET ORAL ONCE
Qty: 21 TABLET | Refills: 0 | Status: SHIPPED | OUTPATIENT
Start: 2024-04-11 | End: 2024-04-11

## 2024-04-11 ASSESSMENT — PAIN SCALES - GENERAL: PAINLEVEL: 8

## 2024-04-11 NOTE — PATIENT INSTRUCTIONS
Rest back as able but avoid prolonged bedrest  Heat or cold as directed  Tylenol as needed for pain  Other medications as directed  Consider physical therapy if not improving  Please see primary care if not improving

## 2024-04-11 NOTE — PROGRESS NOTES
Subjective   Patient ID: Uriel Julien is a 43 y.o. male who presents for Back Pain.  45-year-old male presents with 2 weeks of low back pain.  Patient states that the pain has been constant but is made worse when he changes position.  He states the pain seems somewhat better once he has been moving around.  Patient saw chiropractor in the past week and states a chest x-ray was done at that time.  Chiropractor attempted to help but gave no prescription medications.  Patient has been taking Advil and using ice and heat as well as stretching.  He states the pain radiates to his lateral hips and at some point was radiating to his groin.  He denies any urinary symptoms.  He denies any numbness in his legs.  No known injury or new activities are reported.        Objective   Back Pain:   Gen.: Vitals noted no distress. Afebrile.   Heart: Regular rate rhythm no murmur.   Lungs: Clear to auscultation bilaterally with good aeration and no adventitious breath sounds.   Back: There is mild tenderness to palpation in the midline and paraspinal planes throughout the LS. Normal motor sensory, symmetric reflexes, strong equal peripheral pulses, normal Babinski's bilaterally.   Skin: No rash.   Neuro: No focal neurologic deficits, NIH score of 0.  Deep tendon reflexes in patella 2+ bilateral and equal      Assessment/Plan            Renato Meyers MD 04/11/24 12:21 PM

## 2024-04-17 ENCOUNTER — HOSPITAL ENCOUNTER (OUTPATIENT)
Dept: RADIOLOGY | Facility: EXTERNAL LOCATION | Age: 44
Discharge: HOME | End: 2024-04-17

## 2024-04-17 ENCOUNTER — OFFICE VISIT (OUTPATIENT)
Dept: URGENT CARE | Facility: CLINIC | Age: 44
End: 2024-04-17
Payer: COMMERCIAL

## 2024-04-17 VITALS
WEIGHT: 192 LBS | HEART RATE: 83 BPM | RESPIRATION RATE: 18 BRPM | DIASTOLIC BLOOD PRESSURE: 93 MMHG | OXYGEN SATURATION: 98 % | BODY MASS INDEX: 28.35 KG/M2 | SYSTOLIC BLOOD PRESSURE: 135 MMHG | TEMPERATURE: 97.1 F

## 2024-04-17 DIAGNOSIS — M54.50 LUMBAR BACK PAIN: Primary | ICD-10-CM

## 2024-04-17 DIAGNOSIS — M54.31 SCIATICA OF RIGHT SIDE: ICD-10-CM

## 2024-04-17 DIAGNOSIS — M54.50 LUMBAR BACK PAIN: ICD-10-CM

## 2024-04-17 PROCEDURE — 99213 OFFICE O/P EST LOW 20 MIN: CPT | Performed by: PHYSICIAN ASSISTANT

## 2024-04-17 PROCEDURE — 1036F TOBACCO NON-USER: CPT | Performed by: PHYSICIAN ASSISTANT

## 2024-04-17 PROCEDURE — 3075F SYST BP GE 130 - 139MM HG: CPT | Performed by: PHYSICIAN ASSISTANT

## 2024-04-17 PROCEDURE — 3080F DIAST BP >= 90 MM HG: CPT | Performed by: PHYSICIAN ASSISTANT

## 2024-04-17 RX ORDER — METHOCARBAMOL 500 MG/1
500 TABLET, FILM COATED ORAL 4 TIMES DAILY
Qty: 40 TABLET | Refills: 0 | Status: SHIPPED | OUTPATIENT
Start: 2024-04-17 | End: 2024-04-27

## 2024-04-17 RX ORDER — PREDNISONE 20 MG/1
20 TABLET ORAL 2 TIMES DAILY
Qty: 6 TABLET | Refills: 0 | Status: SHIPPED | OUTPATIENT
Start: 2024-04-17 | End: 2024-04-20

## 2024-04-17 RX ORDER — PREDNISONE 20 MG/1
20 TABLET ORAL 2 TIMES DAILY
Qty: 10 TABLET | Refills: 0 | Status: SHIPPED | OUTPATIENT
Start: 2024-04-17 | End: 2024-04-17

## 2024-04-17 ASSESSMENT — ENCOUNTER SYMPTOMS: BACK PAIN: 1

## 2024-04-17 NOTE — PROGRESS NOTES
Subjective   Patient ID: Uriel Julien is a 43 y.o. male.    Patient is a 43-year-old male who complains of ongoing right lower back pain with radiation to his right hip and leg which he has been experiencing for the past 1 week.  Patient was seen and evaluated at this urgent care facility on 11 April 2024 at which time he was provided with prescriptions for Flexeril 10 mg and a Medrol 4 mg Dosepak.  Patient states he did complete the medication as directed and has experienced no improvement in his symptoms.  Patient has been followed by a chiropractor in the past for same.  Patient denies new or recent accident or injury to explain his symptoms.  Patient denies paresthesia or paralysis to his bilateral legs and states he is able to bear weight and ambulate without difficulty.  Patient states he has full control of bowel and bladder and denies episodes of incontinence.  Patient reports no history of fracture or surgery to his lumbar spine.      Back Pain    The following portions of the chart were reviewed this encounter and updated as appropriate:       Review of Systems   Musculoskeletal:  Positive for back pain.   All other systems reviewed and are negative.  Objective   Physical Exam  Vitals and nursing note reviewed.   Constitutional:       Appearance: Normal appearance. He is normal weight.      Comments: Patient appears relaxed and comfortable sitting in the exam room chair.  Patient demonstrates full range of motion to the bilateral lower extremities and gait and station are noted to be stable and symmetric.   Cardiovascular:      Pulses: Normal pulses.   Pulmonary:      Effort: Pulmonary effort is normal.      Breath sounds: Normal breath sounds.   Neurological:      Mental Status: He is alert.     Assessment/Plan   Physical exam findings as noted above.  X-ray lumbar spine was not obtained at the patient's visit on 11 April 2024 and this was ordered today.  X-ray lumbar spine is negative for acute  findings as reported by the radiologist.  Radiologist does report mild degenerative changes at L5-S1.  Patient was provided with prescriptions for Robaxin 500 mg and prednisone 20 mg.  Patient has no primary care physician and a referral was placed with  Primary Care.  Patient was very clearly advised that there is no further evaluation or management available at this urgent care facility for his lumbar back pain.  Patient was clearly instructed to schedule an appointment with his new primary care physician for further evaluation and management of his lumbar back pain.  Patient was also very clearly instructed to report to an emergency department if he notes any acute worsening of his symptoms to include incontinence of bowel or bladder.  Patient verbalizes clear understanding of same.    CLINICAL IMPRESSION:  Lumbar Back Pain;  Sciatica Right Leg    Diagnoses and all orders for this visit:  Lumbar back pain  -     XR lumbar spine 2-3 views; Future  -     Referral to Primary Care; Future  -     methocarbamol (Robaxin) 500 mg tablet; Take 1 tablet (500 mg) by mouth 4 times a day for 10 days.  -     predniSONE (Deltasone) 20 mg tablet; Take 1 tablet (20 mg) by mouth 2 times a day for 3 days.  Sciatica of right side    Patient disposition: Home

## 2024-04-19 ENCOUNTER — OFFICE VISIT (OUTPATIENT)
Dept: PRIMARY CARE | Facility: CLINIC | Age: 44
End: 2024-04-19
Payer: COMMERCIAL

## 2024-04-19 VITALS
DIASTOLIC BLOOD PRESSURE: 105 MMHG | OXYGEN SATURATION: 98 % | SYSTOLIC BLOOD PRESSURE: 147 MMHG | TEMPERATURE: 97.3 F | HEIGHT: 69 IN | HEART RATE: 88 BPM | BODY MASS INDEX: 29.22 KG/M2 | WEIGHT: 197.3 LBS

## 2024-04-19 DIAGNOSIS — I10 BENIGN ESSENTIAL HYPERTENSION: ICD-10-CM

## 2024-04-19 DIAGNOSIS — M54.50 LUMBAR BACK PAIN: ICD-10-CM

## 2024-04-19 DIAGNOSIS — R73.9 HYPERGLYCEMIA: ICD-10-CM

## 2024-04-19 DIAGNOSIS — Z00.00 ANNUAL PHYSICAL EXAM: Primary | ICD-10-CM

## 2024-04-19 PROBLEM — R06.02 SOB (SHORTNESS OF BREATH) ON EXERTION: Status: RESOLVED | Noted: 2023-10-22 | Resolved: 2024-04-19

## 2024-04-19 PROCEDURE — 3080F DIAST BP >= 90 MM HG: CPT | Performed by: NURSE PRACTITIONER

## 2024-04-19 PROCEDURE — 99386 PREV VISIT NEW AGE 40-64: CPT | Performed by: NURSE PRACTITIONER

## 2024-04-19 PROCEDURE — 3077F SYST BP >= 140 MM HG: CPT | Performed by: NURSE PRACTITIONER

## 2024-04-19 PROCEDURE — 1036F TOBACCO NON-USER: CPT | Performed by: NURSE PRACTITIONER

## 2024-04-19 RX ORDER — METHYLPREDNISOLONE 4 MG/1
8 TABLET ORAL ONCE
COMMUNITY
Start: 2024-04-11 | End: 2024-04-21 | Stop reason: ALTCHOICE

## 2024-04-19 ASSESSMENT — ENCOUNTER SYMPTOMS
ABDOMINAL DISTENTION: 0
EYE ITCHING: 0
NUMBNESS: 0
FREQUENCY: 0
DYSURIA: 0
EYE PAIN: 0
ABDOMINAL PAIN: 0
ACTIVITY CHANGE: 0
VOMITING: 0
HEMATURIA: 0
PALPITATIONS: 0
NERVOUS/ANXIOUS: 0
SHORTNESS OF BREATH: 0
FEVER: 0
CONSTIPATION: 0
WHEEZING: 0
WOUND: 0
DIARRHEA: 0
APPETITE CHANGE: 0
ARTHRALGIAS: 0
COUGH: 0
CHILLS: 0

## 2024-04-19 ASSESSMENT — PATIENT HEALTH QUESTIONNAIRE - PHQ9
SUM OF ALL RESPONSES TO PHQ9 QUESTIONS 1 AND 2: 0
2. FEELING DOWN, DEPRESSED OR HOPELESS: NOT AT ALL
1. LITTLE INTEREST OR PLEASURE IN DOING THINGS: NOT AT ALL

## 2024-04-19 NOTE — PROGRESS NOTES
Uriel Julien male   1980 43 y.o.   [id number]    Chief Complaint  New Patient Visit.    History Of Present Illness  Uriel Julien is a 43 y.o. male presents today to establish care.     Acute concern:  Back pain x 3 weeks.  No injury. No increased activity.  Was sore throughout day then could barely move by end of day.  Seen by Chiro - told muscles very tense.   Could not  bend.  Eventually after several chiro visits felt slightly better but then pain restarted on 4/11/24. Seen at urgent care on 4/11/2024 and again on 4/17/2024 for right lower back pain with radiation to his right hip and leg.  On 4/11/2024 was treated with Flexeril and a Medrol dose pack.  Upon return on 4/17/2024 lumbar x-ray was obtained which was negative for any acute findings, reports mild degenerative changes at L5-S1.  Patient was given Robaxin and prednisone prescription and advised to set up an appointment with PCP.    Now mostly tightness in hips and pulling on quads.  Denies numbness, tingling, saddle paresthesia, gait changes, bowel or bladder incontinence, falls, and injuries.  Has been stretching over last 6 months.    Had tried ibuprofen and tylenol but not over last few days.      chronic conditions reviewed:   HTN   States bp runs somewhat higher alma in office. Has never taken BP meds in past.  States would like to try lifestyle interventions before starting any medications.    Reviewed past labs and note hyperglycemia.  Patient is not sure if those labs were fasting or not.  Denies known history of diabetes or hyperglycemia.    Review of Systems  Review of Systems   Constitutional:  Negative for activity change, appetite change, chills and fever.   HENT:  Negative for congestion.    Eyes:  Negative for pain and itching.   Respiratory:  Negative for cough, shortness of breath and wheezing.    Cardiovascular:  Negative for chest pain, palpitations and leg swelling.   Gastrointestinal:  Negative for abdominal distention,  "abdominal pain, constipation, diarrhea and vomiting.   Genitourinary:  Negative for dysuria, frequency, hematuria and urgency.   Musculoskeletal:  Negative for arthralgias and gait problem.   Skin:  Negative for rash and wound.   Neurological:  Negative for numbness.   Psychiatric/Behavioral:  The patient is not nervous/anxious.        Diet: Well ba lanced   Exercise:  Exercise usually 3-5 days per week   Dental: Twice per year   Ophtho: No needs        Immunizations:   Flu-recommended in fall   Tdap- will check records at home.    Covid- had initial       Past Medical History  He has a past medical history of Exercise-induced asthma ([facility]) (10/22/2023).    Surgical History  He has a past surgical history that includes Other surgical history (01/03/2022) and Hernia repair (11/01/2023).    Family History  Family History   Problem Relation Name Age of Onset    Hypertension Mother      Macular degeneration Mother      Breast cancer Maternal Grandmother      Hyperlipidemia Maternal Grandfather      Hypertension Maternal Grandfather          Social History  He reports that he has never smoked. He has never used smokeless tobacco. He reports current alcohol use of about 2.0 standard drinks of alcohol per week. He reports that he does not use drugs.    DEPRESSION SCREEN  Over the past 2 weeks, how often have you been bothered by any of the following problems?  Little interest or pleasure in doing things: Not at all  Feeling down, depressed, or hopeless: Not at all    Allergies  Patient has no known allergies.    Medications  Current Outpatient Medications   Medication Instructions    methocarbamol (ROBAXIN) 500 mg, oral, 4 times daily        Objective   BP (!) 147/105 (BP Location: Left arm, Patient Position: Sitting, BP Cuff Size: Adult)   Pulse 88   Temp 36.3 °C (97.3 °F) (Temporal)   Ht 1.753 m (5' 9\")   Wt 89.5 kg (197 lb 4.8 oz)   SpO2 98%   BMI 29.14 kg/m²    BMI: Estimated body mass index is 29.14 kg/m² as " "calculated from the following:    Height as of this encounter: 1.753 m (5' 9\").    Weight as of this encounter: 89.5 kg (197 lb 4.8 oz).    Physical Exam  Physical Exam  Vitals and nursing note reviewed.   Constitutional:       Appearance: Normal appearance.   HENT:      Head: Normocephalic and atraumatic.      Nose: Nose normal.      Mouth/Throat:      Mouth: Mucous membranes are moist.      Pharynx: Oropharynx is clear.   Eyes:      Extraocular Movements: Extraocular movements intact.      Conjunctiva/sclera: Conjunctivae normal.      Pupils: Pupils are equal, round, and reactive to light.   Cardiovascular:      Rate and Rhythm: Normal rate and regular rhythm.      Pulses: Normal pulses.      Heart sounds: Normal heart sounds.   Pulmonary:      Effort: Pulmonary effort is normal.      Breath sounds: Normal breath sounds.   Abdominal:      General: Bowel sounds are normal.      Palpations: Abdomen is soft.      Tenderness: There is no abdominal tenderness.   Musculoskeletal:         General: Normal range of motion.      Cervical back: Neck supple.   Skin:     General: Skin is warm and dry.   Neurological:      General: No focal deficit present.      Mental Status: He is alert and oriented to person, place, and time. Mental status is at baseline.   Psychiatric:         Mood and Affect: Mood normal.         Behavior: Behavior normal.         Thought Content: Thought content normal.         Judgment: Judgment normal.         Relevant Results and Imaging  Lab on 11/01/2023   Component Date Value Ref Range Status    WBC 11/01/2023 6.0  4.4 - 11.3 x10*3/uL Final    nRBC 11/01/2023 0.0  0.0 - 0.0 /100 WBCs Final    RBC 11/01/2023 4.87  4.50 - 5.90 x10*6/uL Final    Hemoglobin 11/01/2023 14.9  13.5 - 17.5 g/dL Final    Hematocrit 11/01/2023 44.4  41.0 - 52.0 % Final    MCV 11/01/2023 91  80 - 100 fL Final    MCH 11/01/2023 30.6  26.0 - 34.0 pg Final    MCHC 11/01/2023 33.6  32.0 - 36.0 g/dL Final    RDW 11/01/2023 12.9  " 11.5 - 14.5 % Final    Platelets 11/01/2023 238  150 - 450 x10*3/uL Final    Glucose 11/01/2023 152 (H)  74 - 99 mg/dL Final    Sodium 11/01/2023 137  136 - 145 mmol/L Final    Potassium 11/01/2023 4.2  3.5 - 5.3 mmol/L Final    Chloride 11/01/2023 102  98 - 107 mmol/L Final    Bicarbonate 11/01/2023 25  21 - 32 mmol/L Final    Anion Gap 11/01/2023 14  10 - 20 mmol/L Final    Urea Nitrogen 11/01/2023 16  6 - 23 mg/dL Final    Creatinine 11/01/2023 1.08  0.50 - 1.30 mg/dL Final    eGFR 11/01/2023 88  >60 mL/min/1.73m*2 Final    Calculations of estimated GFR are performed using the 2021 CKD-EPI Study Refit equation without the race variable for the IDMS-Traceable creatinine methods.  https://jasn.asnjournals.org/content/early/2021/09/22/ASN.3020351633    Calcium 11/01/2023 9.4  8.6 - 10.3 mg/dL Final     No images are attached to the encounter.        Assessment and Plan  Assessment/Plan   Problem List Items Addressed This Visit             ICD-10-CM    Benign essential hypertension I10     -Elevated blood pressure  - Would like to monitor at home and implement healthy habits and achieve weight loss goals prior to starting any medications  - Follow-up in 6 months-at that time we will determine if BP meds are needed  -Advised to monitor blood pressure at home with goal blood pressure less than 140/90         Acute low back pain M54.50     New since 3/2024  -s/p steroids x2, robaxin, prn tylenol   -Status post chiro visits  - 4/17/2024 x-ray lumbar spine-no acute findings, mild degenerative changes at L5-S1.   -Complete steroids, continue use of Robaxin, stretching, ice/heat  - If pain persists start physical therapy  - Consider MRI lumbar spine          Other Visit Diagnoses         Codes    Annual physical exam    -  Primary Z00.00    Relevant Orders    Comprehensive Metabolic Panel    Lipid Panel    Hyperglycemia     R73.9    Relevant Orders    Hemoglobin A1C          HM  - Continue routine dental visits  - Continue  well-balanced diet  - Continue healthy habits-Aim for 30 minutes of aerobic exercise, 5 times per week. Try to cut back on processed foods, including foods made with flour, sugar, and added salt.   - Flu vaccine in the fall  - Check records for Tdap  - COVID boosters at pharmacy

## 2024-04-21 PROBLEM — J45.990 EXERCISE-INDUCED ASTHMA (HHS-HCC): Status: RESOLVED | Noted: 2023-10-22 | Resolved: 2024-04-21

## 2024-04-21 NOTE — ASSESSMENT & PLAN NOTE
New since 3/2024  -s/p steroids x2, robaxin, prn tylenol   -Status post chiro visits  - 4/17/2024 x-ray lumbar spine-no acute findings, mild degenerative changes at L5-S1.   -Complete steroids, continue use of Robaxin, stretching, ice/heat  - If pain persists start physical therapy  - Consider MRI lumbar spine

## 2024-04-21 NOTE — ASSESSMENT & PLAN NOTE
-Elevated blood pressure  - Would like to monitor at home and implement healthy habits and achieve weight loss goals prior to starting any medications  - Follow-up in 6 months-at that time we will determine if BP meds are needed  -Advised to monitor blood pressure at home with goal blood pressure less than 140/90

## 2024-10-18 ENCOUNTER — APPOINTMENT (OUTPATIENT)
Dept: PRIMARY CARE | Facility: CLINIC | Age: 44
End: 2024-10-18
Payer: COMMERCIAL

## (undated) DEVICE — BINDER, ABDOMINAL, 3 PANEL, 9 X 46-62 IN, MED/LG

## (undated) DEVICE — DRESSING, TELFA, 3X4

## (undated) DEVICE — SLEEVE, VASO PRESS, CALF GARMENT, MEDIUM, GREEN

## (undated) DEVICE — Device

## (undated) DEVICE — DRESSING, GAUZE, SPONGE, 8 PLY, CURITY, 2 X 2 IN, STERILE

## (undated) DEVICE — DRESSING, TRANSPARENT, TEGADERM, 4 X 4-3/4 IN, NO LABEL

## (undated) DEVICE — SYRINGE, 10 CC, SLIP TIP